# Patient Record
Sex: MALE | Employment: UNEMPLOYED | URBAN - METROPOLITAN AREA
[De-identification: names, ages, dates, MRNs, and addresses within clinical notes are randomized per-mention and may not be internally consistent; named-entity substitution may affect disease eponyms.]

---
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
## 2025-01-13 ENCOUNTER — HOSPITAL ENCOUNTER (OUTPATIENT)
Dept: NON INVASIVE DIAGNOSTICS | Facility: HOSPITAL | Age: 20
Discharge: HOME | End: 2025-01-13
Payer: OTHER GOVERNMENT

## 2025-01-13 VITALS
DIASTOLIC BLOOD PRESSURE: 82 MMHG | BODY MASS INDEX: 24 KG/M2 | OXYGEN SATURATION: 98 % | WEIGHT: 187 LBS | HEART RATE: 79 BPM | SYSTOLIC BLOOD PRESSURE: 122 MMHG | HEIGHT: 74 IN

## 2025-01-13 DIAGNOSIS — R93.89 ABNORMAL MRI: ICD-10-CM

## 2025-01-13 LAB
CHEST PAIN STATEMENT: NORMAL
MAX DIASTOLIC BP: 68 MMHG
MAX PREDICTED HEART RATE: 201 BPM
PROTOCOL NAME: NORMAL
STRESS POST EXERCISE DUR MIN: 12 MIN
STRESS POST EXERCISE DUR SEC: 35 SEC
STRESS POST PEAK HR: 171 BPM
STRESS POST PEAK SYSTOLIC BP: 160 MMHG
TARGET HR FORMULA: NORMAL
TEST INDICATION: NORMAL

## 2025-01-13 PROCEDURE — 93356 MYOCRD STRAIN IMG SPCKL TRCK: CPT

## 2025-01-13 PROCEDURE — 93356 MYOCRD STRAIN IMG SPCKL TRCK: CPT | Performed by: INTERNAL MEDICINE

## 2025-01-13 PROCEDURE — 93350 STRESS TTE ONLY: CPT | Performed by: INTERNAL MEDICINE

## 2025-01-13 PROCEDURE — 93350 STRESS TTE ONLY: CPT

## 2025-01-16 LAB
MAX HR PERCENT: 85 %
MAX HR: 171 BPM
RATE PRESSURE PRODUCT: NORMAL
SL CV LV EF: 55
SL CV STRESS RECOVERY BP: NORMAL MMHG
SL CV STRESS RECOVERY HR: 87 BPM
SL CV STRESS RECOVERY O2 SAT: 97 %
SL CV STRESS STAGE REACHED: 7
STRESS ANGINA INDEX: 0
STRESS BASELINE BP: NORMAL MMHG
STRESS BASELINE HR: 79 BPM
STRESS O2 SAT REST: 98 %
STRESS PEAK HR: 171 BPM
STRESS POST ESTIMATED WORKLOAD: 7.7 METS
STRESS POST EXERCISE DUR MIN: 12 MIN
STRESS POST EXERCISE DUR SEC: 35 SEC
STRESS POST O2 SAT PEAK: 97 %
STRESS POST PEAK BP: 160 MMHG

## 2025-03-19 ENCOUNTER — OFFICE VISIT (OUTPATIENT)
Dept: CARDIOLOGY CLINIC | Facility: CLINIC | Age: 20
End: 2025-03-19
Payer: OTHER GOVERNMENT

## 2025-03-19 VITALS
WEIGHT: 200 LBS | SYSTOLIC BLOOD PRESSURE: 132 MMHG | DIASTOLIC BLOOD PRESSURE: 78 MMHG | HEART RATE: 71 BPM | OXYGEN SATURATION: 99 % | BODY MASS INDEX: 25.68 KG/M2

## 2025-03-19 DIAGNOSIS — I49.3 PVC'S (PREMATURE VENTRICULAR CONTRACTIONS): ICD-10-CM

## 2025-03-19 DIAGNOSIS — I51.4 MYOCARDITIS, UNSPECIFIED CHRONICITY, UNSPECIFIED MYOCARDITIS TYPE (HCC): Primary | ICD-10-CM

## 2025-03-19 PROCEDURE — 99214 OFFICE O/P EST MOD 30 MIN: CPT | Performed by: INTERNAL MEDICINE

## 2025-03-19 NOTE — PROGRESS NOTES
Advanced Heart Failure Outpatient Progress Note - Hebert Lemus 20 y.o. male MRN: [id number]    Encounter: [id number]      Assessment/Plan:    Patient Active Problem List    Diagnosis Date Noted    Palpitations 03/15/2024    Abnormal EKG 03/15/2024    Hypokalemia 03/15/2024    Hypomagnesemia 03/15/2024       # Palpitations, resolved  - Unclear etiology, likely related to dehydration vs acute illness    # PVCs, very low burden (<0.1%) on 48 hour holter monitor  1 week Zio 7/8/24: Patient had a min HR of 44 bpm, max HR of 199 bpm, and avg HR of 81 bpm. Predominant underlying rhythm was Sinus Rhythm. Isolated SVEs were rare (<1.0%), and no SVE Couplets or SVE Triplets were present. Isolated VEs were rare (<1.0%), VE Couplets were rare (<1.0%), and no VE Triplets were present.     # Mildly increased wall thickness on echo  Normal LV mass index on cardiac MRI, possible athlete's heart. No evidence of infiltrative disease on cardiac MRI    # Myocarditis  - Unable to determine acuity based on cardiac MRI. No troponin elevation to suggest acute myocarditis during initial evaluation in March 2024 so less likely acute. Mother reports family being sick with URI and one sibling diagnosed with RSV a week or two prior.   - I personally reviewed the cardiac MRI with Dr. Willis in the office on 6/21/24 and findings most likely myocarditis. No other imaging findings concerning for sarcoidosis or infiltrative disease  - Normal exercise stress echo 1/13/25  - Obtain repeat cardiac MRI to reassess myocarditis    Studies- personally reviewed by me    Exercise stress echo 1/13/25:  A bicycle protocol stress test was performed. Overall, the patient's exercise capacity was normal for their age. The patient reached stage 7.0of the protocol after exercising for 12 min and 35 sec and had a maximal HR of 171 bpm (85% of MPHR) and 7.7 METS. The patient experienced no angina during the test. The patient achieved the target heart rate. The  patient reported no symptoms during the stress test. Blood pressure demonstrated a normal response and heart rate demonstrated a normal response to stress.   Left ventricle cavity has normal reduction in size at peak stress. The left ventricle systolic function is normal at peak stress. The peak stress echo showed normal wall motion.   Study impression: normal    Zio 7/8/24:  Patient had a min HR of 44 bpm, max HR of 199 bpm, and avg HR of 81 bpm. Predominant underlying rhythm was Sinus Rhythm. Isolated SVEs were rare (<1.0%), and no SVE Couplets or SVE Triplets were present. Isolated VEs were rare (<1.0%), VE Couplets were rare (<1.0%), and no VE Triplets were present.   TTE 6/29/24 [facility]: LVEF 61-65%. Normal LV size and wall thickness. Normal RV size.    Cardiac MRI 6/11/24:  Nonspecific late gadolinium enhancement in 3 focal areas (basal anteroseptal wall, mid inferoseptal wall, mid inferolateral wall). Differential includes myocarditis (acuity cannot be determined on the basis of this study) versus (less likely) inflammatory cardiomyopathy.  Mildly dilated left and right ventricles with normal LV and RV function. LVEF: 61%; RVEF: 56%. Moderately dilated left atrium. Normal LV mass index.   Small pericardial effusion with fluid present mainly inferiorly.   No evidence of infiltrative cardiomyopathy.   Consider repeat study in 3 months to evaluate progression/resolution of LV hyperenhancement if clinically indicated.     TTE 3/15/24  LVEF: 60%  LVIDd: 4.3cm  RV: normal size and systolic function  MR: trace  AV: normal   PASP: normal - 21 mmHg, mild TR  RVOT: no notching  Other: mildly increased wall thickness    EKG 3/14/24: sinus rhythm, nonspecific T wave abnormality, early repolarization      HPI:   6/21/24: 19 year old male with no known significant past medical history who presented with intermittent palpitations at the ED on 3/14/24 that has been occurring for the past couple of weeks. Reported dizziness and  blurry vision at that time. He was working on the car and it was warm and he was not hydrated. No syncope or near syncope. He does not have any history of heart disease or heart murmur in the past. He has no family history of sudden death, premature CAD or cardiomyopathy. He is very active and plays basketball. Mother reports that a week or two prior, the  whole family with viral syndrome and one sibling diagnosed with RSV. EKG at that time showed sinus rhythm with nonspecific T wave abnormality and early repolarization, QT normal.  He had no significant troponin elevation. He had an echocardiogram which showed normal biventricular size and systolic function, mild concentric hypertrophy and no significant valvular heart disease. He was discharged and had an outpatient holter monitor on 3/15/24 was placed which showed normal sinus rhythm with <0.1% burden of PVCs and PACs, no sustained or nonsustained episodes of PSVT or NSVT.   He was seen in the ED 3/21/24 for palpitations in [facility] that lasted for 20 minutes. EKG showed early repolarization and no arrhythmias noted during period of monitoring. He was seen in cardiology follow up 4/12/24 and was still reporting intermittent episodes of palpitations. Advised to obtain longer period of cardiac monitoring but opted to monitor symptoms at that time. He had a cardiac MRI 6/11/24 which showed nonspecific late gadolinium enhancement in 3 focal areas concerning for myocarditis. Patient is then referred for further evaluation and management  Patient is with his mother today. He is currently denying any more episodes of palpitations. No shortness of breath, chest pain, syncope or near syncope. He is anticipating going to college with scholarship to play division 1 basketball.     9/18/2024.:  Patient is here for follow-up.  Last seen as above.  He had a 1 week heart monitor which showed predominantly sinus rhythm with less than 1% PVC burden.  He was scheduled for an  "exercise stress echocardiogram for further evaluation but he was hospitalized at [facility] for behavioral issues.  He is now doing better.  He is in a community college and playing basketball but taking it easy.  Denies palpitations, dizziness or lightheadedness.  No chest pain or shortness of breath and current level of exertion.  No syncope or near syncope.    3/19/25: here for follow up. Last seen as above. He is overall doing well since last visit. He had the exercise stress echo completed 1/2025 which showed normal findings. He is playing basketball with no symptoms. No shortness of breath or chest pain on current level of exertion. No palpitations, dizziness or lightheadedness. EKG today with rhythm strip showed normal sinus rhythm, no PVCs.    History reviewed. No pertinent past medical history.    Review of Systems   Constitutional:  Negative for chills, fatigue and fever.   HENT:  Negative for ear pain and sore throat.    Eyes:  Negative for pain and visual disturbance.   Respiratory:  Negative for cough, chest tightness and shortness of breath.    Cardiovascular:  Negative for chest pain and palpitations.   Gastrointestinal:  Negative for abdominal pain and vomiting.   Genitourinary:  Negative for dysuria and hematuria.   Musculoskeletal:  Negative for arthralgias and back pain.   Skin:  Negative for color change and rash.   Neurological:  Negative for dizziness, seizures, syncope and light-headedness.   All other systems reviewed and are negative.       Allergies   Allergen Reactions    Geodon [Ziprasidone] Other (See Comments)     \"He gets stiff\"     .    Current Outpatient Medications:     divalproex sodium (DEPAKOTE) 250 mg DR tablet, Take 250 mg by mouth every 12 (twelve) hours, Disp: , Rfl:     hydrOXYzine pamoate (VISTARIL) 50 mg capsule, Take 1 capsule (50 mg total) by mouth 3 (three) times a day as needed for itching for up to 15 days (Patient not taking: Reported on 9/18/2024), Disp: 30 " capsule, Rfl: 0    LORazepam (ATIVAN) 0.5 mg tablet, Take 0.5 mg by mouth 3 (three) times a day, Disp: , Rfl:     OLANZapine (ZyPREXA) 5 mg tablet, Take 5 mg by mouth every evening, Disp: , Rfl:     ondansetron (ZOFRAN-ODT) 4 mg disintegrating tablet, Take 1 tablet (4 mg total) by mouth every 6 (six) hours as needed for nausea or vomiting (Patient not taking: Reported on 9/18/2024), Disp: 20 tablet, Rfl: 0    Social History     Socioeconomic History    Marital status: Single     Spouse name: Not on file    Number of children: Not on file    Years of education: Not on file    Highest education level: Not on file   Occupational History    Not on file   Tobacco Use    Smoking status: Never    Smokeless tobacco: Never   Vaping Use    Vaping status: Never Used   Substance and Sexual Activity    Alcohol use: Never    Drug use: Never    Sexual activity: Not on file   Other Topics Concern    Not on file   Social History Narrative    Not on file     Social Drivers of Health     Financial Resource Strain: Patient Declined (7/23/2024)    Received from [facility]    Overall Financial Resource Strain (CARDIA)     Difficulty of Paying Living Expenses: Patient declined   Recent Concern: Financial Resource Strain - Medium Risk (7/1/2024)    Received from [facility]    Overall Financial Resource Strain (CARDIA)     Difficulty of Paying Living Expenses: Somewhat hard   Food Insecurity: Patient Declined (7/23/2024)    Received from [facility]    Hunger Vital Sign     Worried About Running Out of Food in the Last Year: Patient declined     Ran Out of Food in the Last Year: Patient declined   Recent Concern: Food Insecurity - Food Insecurity Present (7/1/2024)    Received from [facility]    Hunger Vital Sign     Worried About Running Out of Food in the Last Year: Sometimes true     Ran Out of Food in the Last Year: Sometimes true   Transportation Needs: Patient  Declined (7/23/2024)    Received from [facility]    PRAPARE - Transportation     Lack of Transportation (Medical): Patient declined     Lack of Transportation (Non-Medical): Patient declined   Physical Activity: Not on file   Stress: Not on file   Social Connections: Not on file   Intimate Partner Violence: Patient Declined (7/23/2024)    [facility]    Humiliation, Afraid, Rape, and Kick questionnaire     Fear of Current or Ex-Partner: Patient declined     Emotionally Abused: Patient declined     Physically Abused: Patient declined     Sexually Abused: Patient declined   Housing Stability: High Risk (7/23/2024)    Received from [facility]    Housing Stability Vital Sign     Unable to Pay for Housing in the Last Year: No     Number of Times Moved in the Last Year: 2     Homeless in the Last Year: No       Family History   Problem Relation Age of Onset    No Known Problems Mother     No Known Problems Father        Physical Exam:    Vitals: Blood pressure 132/78, pulse 71, weight 90.7 kg (200 lb), SpO2 99%., Body mass index is 25.68 kg/m².,   Wt Readings from Last 3 Encounters:   03/19/25 90.7 kg (200 lb)   01/13/25 84.8 kg (187 lb) (85%, Z= 1.05)*   09/18/24 85.2 kg (187 lb 12.8 oz) (87%, Z= 1.10)*     * Growth percentiles are based on CDC (Boys, 2-20 Years) data.       Physical Exam  Constitutional:       General: He is not in acute distress.     Appearance: Normal appearance.   HENT:      Head: Normocephalic and atraumatic.      Mouth/Throat:      Mouth: Mucous membranes are moist.   Eyes:      General: No scleral icterus.     Extraocular Movements: Extraocular movements intact.   Neck:      Vascular: No JVD.   Cardiovascular:      Rate and Rhythm: Normal rate and regular rhythm.      Pulses: Normal pulses.      Heart sounds: S1 normal and S2 normal. No murmur heard.     No friction rub. No gallop.   Pulmonary:       "Effort: Pulmonary effort is normal.      Breath sounds: Normal breath sounds.   Abdominal:      General: There is no distension.      Palpations: Abdomen is soft.      Tenderness: There is no abdominal tenderness. There is no guarding or rebound.   Musculoskeletal:         General: Normal range of motion.      Cervical back: Neck supple.      Right lower leg: No edema.      Left lower leg: No edema.   Skin:     General: Skin is warm and dry.      Capillary Refill: Capillary refill takes less than 2 seconds.   Neurological:      General: No focal deficit present.      Mental Status: He is alert and oriented to person, place, and time.   Psychiatric:         Mood and Affect: Mood normal.         Labs & Results:    Lab Results   Component Value Date    WBC 7.82 07/21/2024    HGB 13.1 07/21/2024    HCT 38.3 07/21/2024    MCV 83 07/21/2024     07/21/2024     Lab Results   Component Value Date    SODIUM 137 07/23/2024    K 3.8 07/23/2024     07/23/2024    CO2 28 07/23/2024    BUN 12 07/23/2024    CREATININE 0.96 07/23/2024    GLUC 91 07/23/2024    CALCIUM 9.3 07/23/2024     No results found for: \"NTBNP\"   No results found for: \"CHOLESTEROL\"  No results found for: \"HDL\"  No results found for: \"TRIG\"  No results found for: \"NONHDLC\"    EKG personally reviewed by Jean Angeles MD.     Counseling / Coordination of Care  I have spent a total time of 30 minutes on 06/21/24 in caring for this patient including Diagnostic results, Impressions, Documenting in the medical record, Reviewing / ordering tests, medicine, procedures  , Obtaining or reviewing history  , and Communicating with other healthcare professionals .     Thank you for the opportunity to participate in the care of this patient.    JEAN ANGELES MD  ADVANCED HEART FAILURE AND MECHANICAL CIRCULATORY SUPPORT  [facility]     "

## 2025-04-09 ENCOUNTER — HOSPITAL ENCOUNTER (OUTPATIENT)
Dept: MRI IMAGING | Facility: HOSPITAL | Age: 20
Discharge: HOME | End: 2025-04-09
Attending: INTERNAL MEDICINE
Payer: OTHER GOVERNMENT

## 2025-04-09 DIAGNOSIS — I51.4 MYOCARDITIS, UNSPECIFIED CHRONICITY, UNSPECIFIED MYOCARDITIS TYPE (HCC): ICD-10-CM

## 2025-04-09 PROCEDURE — 75561 CARDIAC MRI FOR MORPH W/DYE: CPT

## 2025-04-09 PROCEDURE — A9585 GADOBUTROL INJECTION: HCPCS | Performed by: INTERNAL MEDICINE

## 2025-04-09 RX ORDER — GADOBUTROL 604.72 MG/ML
18 INJECTION INTRAVENOUS
Status: COMPLETED | OUTPATIENT
Start: 2025-04-09 | End: 2025-04-09

## 2025-04-09 RX ADMIN — GADOBUTROL 18 ML: 604.72 INJECTION INTRAVENOUS at 12:48

## 2025-04-16 ENCOUNTER — RESULTS FOLLOW-UP (OUTPATIENT)
Dept: CARDIOLOGY CLINIC | Facility: CLINIC | Age: 20
End: 2025-04-16

## 2025-05-20 ENCOUNTER — HOSPITAL ENCOUNTER (EMERGENCY)
Facility: HOSPITAL | Age: 20
Discharge: HOME | End: 2025-05-20
Attending: STUDENT IN AN ORGANIZED HEALTH CARE EDUCATION/TRAINING PROGRAM | Admitting: STUDENT IN AN ORGANIZED HEALTH CARE EDUCATION/TRAINING PROGRAM
Payer: OTHER GOVERNMENT

## 2025-05-20 VITALS
TEMPERATURE: 98.8 F | DIASTOLIC BLOOD PRESSURE: 78 MMHG | SYSTOLIC BLOOD PRESSURE: 146 MMHG | RESPIRATION RATE: 17 BRPM | OXYGEN SATURATION: 100 % | HEART RATE: 66 BPM

## 2025-05-20 DIAGNOSIS — Z13.9 ENCOUNTER FOR MEDICAL SCREENING EXAMINATION: Primary | ICD-10-CM

## 2025-05-20 PROCEDURE — 99283 EMERGENCY DEPT VISIT LOW MDM: CPT | Performed by: STUDENT IN AN ORGANIZED HEALTH CARE EDUCATION/TRAINING PROGRAM

## 2025-05-20 PROCEDURE — 99282 EMERGENCY DEPT VISIT SF MDM: CPT

## 2025-05-20 NOTE — ED PROVIDER NOTES
"  ED Disposition       None          Assessment & Plan       Medical Decision Making  Differential medical screening exam, behavioral health condition, dehydration.    Patient is a well-appearing 20-year-old male present emerged from no acute respiratory distress and vital signs unremarkable.  On initial physical examination patient was alert oriented answering questions according to person place and time.  He is aware of location.  He denies any SI, HI, visual or auditory hallucinations.  Patient does report to feeling safe at home.  Patient does not currently want any medical intervention.  He is currently awaiting a ride and  from his mother.  Discussed patient with mother over the phone and she does not want any interventions from a behavioral health standpoint.  Her plan is to  patient.  Patient was provided with a sandwich.  He is muttering in the room, at times the reciting scripture, he pulled the clock off the wall and politely gave it back.  Sister arrived to pick patient up.  Discussed return at any point in time if needed.        Amount and/or Complexity of Data Reviewed  Independent Historian: parent and EMS  External Data Reviewed: labs, radiology and notes.             Medications - No data to display    ED Risk Strat Scores                    No data recorded                            History of Present Illness       Chief Complaint   Patient presents with    Psychiatric Evaluation     Pt picked up at [facility] by EMS. Construction staff called after pt was sitting at site and reciting bible verses. Pt's dad says pt has \"been off for two days.\"        No past medical history on file.   Past Surgical History:   Procedure Laterality Date    FL LUMBAR PUNCTURE DIAGNOSTIC  6/28/2024      Family History   Problem Relation Age of Onset    No Known Problems Mother     No Known Problems Father       Social History[1]   E-Cigarette/Vaping    E-Cigarette Use Never User     "   E-Cigarette/Vaping Substances    Nicotine No     THC No     CBD No     Flavoring No     Other No     Unknown No       I have reviewed and agree with the history as documented.     HPI    Patient is a 20-year-old male presenting to the emergency department via EMS.  He arrives after being on scene at a construction site.  At the site he was sitting and reciting Bible verses.  Per EMS dad was on scene and reports to him being off for approximately 2 days.  Patient refuses to communicate to me but does however communicate through the nurse.  He does not want any medical intervention at this time.  Denies any SI, HI, visual or auditory hallucinations.  Denies any medications or medical history.    Review of Systems   Constitutional:  Negative for chills and fever.   HENT:  Negative for ear pain and sore throat.    Eyes:  Negative for pain and visual disturbance.   Respiratory:  Negative for cough and shortness of breath.    Cardiovascular:  Negative for chest pain and palpitations.   Gastrointestinal:  Negative for abdominal pain and vomiting.   Genitourinary:  Negative for dysuria and hematuria.   Musculoskeletal:  Negative for arthralgias and back pain.   Skin:  Negative for color change and rash.   Neurological:  Negative for seizures and syncope.   All other systems reviewed and are negative.          Objective       ED Triage Vitals [05/20/25 1345]   Temperature Pulse Blood Pressure Respirations SpO2 Patient Position - Orthostatic VS   98.8 °F (37.1 °C) 66 146/78 17 100 % --      Temp Source Heart Rate Source BP Location FiO2 (%) Pain Score    Oral -- -- -- --      Vitals      Date and Time Temp Pulse SpO2 Resp BP Pain Score FACES Pain Rating User   05/20/25 1345 98.8 °F (37.1 °C) -- -- -- -- -- -- [facility]   05/20/25 1345 -- 66 100 % 17 146/78 -- -- TW            Physical Exam  Vitals and nursing note reviewed.   Constitutional:       General: He is not in acute distress.     Appearance: He is well-developed.   HENT:       Head: Normocephalic and atraumatic.     Eyes:      Conjunctiva/sclera: Conjunctivae normal.       Cardiovascular:      Rate and Rhythm: Normal rate and regular rhythm.      Heart sounds: No murmur heard.  Pulmonary:      Effort: Pulmonary effort is normal. No respiratory distress.      Breath sounds: Normal breath sounds.   Abdominal:      Palpations: Abdomen is soft.      Tenderness: There is no abdominal tenderness.     Musculoskeletal:         General: No swelling.      Cervical back: Neck supple.     Skin:     General: Skin is warm and dry.      Capillary Refill: Capillary refill takes less than 2 seconds.     Neurological:      General: No focal deficit present.      Mental Status: He is alert and oriented to person, place, and time.     Psychiatric:         Mood and Affect: Mood normal.         Results Reviewed       None            No orders to display       Procedures    ED Medication and Procedure Management   Prior to Admission Medications   Prescriptions Last Dose Informant Patient Reported? Taking?   LORazepam (ATIVAN) 0.5 mg tablet  Self Yes No   Sig: Take 0.5 mg by mouth 3 (three) times a day   OLANZapine (ZyPREXA) 5 mg tablet  Self Yes No   Sig: Take 5 mg by mouth every evening   divalproex sodium (DEPAKOTE) 250 mg DR tablet  Self Yes No   Sig: Take 250 mg by mouth every 12 (twelve) hours   hydrOXYzine pamoate (VISTARIL) 50 mg capsule   No No   Sig: Take 1 capsule (50 mg total) by mouth 3 (three) times a day as needed for itching for up to 15 days   Patient not taking: Reported on 9/18/2024   ondansetron (ZOFRAN-ODT) 4 mg disintegrating tablet  Self No No   Sig: Take 1 tablet (4 mg total) by mouth every 6 (six) hours as needed for nausea or vomiting   Patient not taking: Reported on 9/18/2024      Facility-Administered Medications: None     Patient's Medications   Discharge Prescriptions    No medications on file     No discharge procedures on file.  ED SEPSIS DOCUMENTATION                [1]    Social History  Tobacco Use    Smoking status: Never    Smokeless tobacco: Never   Vaping Use    Vaping status: Never Used   Substance Use Topics    Alcohol use: Never    Drug use: Never        Sofia Jason DO  05/20/25 1504

## 2025-06-18 ENCOUNTER — HOSPITAL ENCOUNTER (EMERGENCY)
Facility: HOSPITAL | Age: 20
End: 2025-06-19
Attending: EMERGENCY MEDICINE
Payer: OTHER GOVERNMENT

## 2025-06-18 DIAGNOSIS — F29 PSYCHOSIS (HCC): Primary | ICD-10-CM

## 2025-06-18 LAB
AMPHETAMINES SERPL QL SCN: NEGATIVE
BARBITURATES UR QL: NEGATIVE
BENZODIAZ UR QL: NEGATIVE
COCAINE UR QL: NEGATIVE
ETHANOL EXG-MCNC: 0 MG/DL
FENTANYL UR QL SCN: NEGATIVE
HYDROCODONE UR QL SCN: NEGATIVE
METHADONE UR QL: NEGATIVE
OPIATES UR QL SCN: NEGATIVE
OXYCODONE+OXYMORPHONE UR QL SCN: NEGATIVE
PCP UR QL: NEGATIVE
THC UR QL: NEGATIVE

## 2025-06-18 PROCEDURE — 82075 ASSAY OF BREATH ETHANOL: CPT | Performed by: EMERGENCY MEDICINE

## 2025-06-18 PROCEDURE — 80307 DRUG TEST PRSMV CHEM ANLYZR: CPT | Performed by: EMERGENCY MEDICINE

## 2025-06-18 PROCEDURE — 99285 EMERGENCY DEPT VISIT HI MDM: CPT | Performed by: EMERGENCY MEDICINE

## 2025-06-18 PROCEDURE — 99283 EMERGENCY DEPT VISIT LOW MDM: CPT

## 2025-06-18 RX ORDER — LORAZEPAM 1 MG/1
1 TABLET ORAL 2 TIMES DAILY PRN
Status: DISCONTINUED | OUTPATIENT
Start: 2025-06-18 | End: 2025-06-19 | Stop reason: HOSPADM

## 2025-06-18 RX ORDER — OLANZAPINE 10 MG/1
20 TABLET, FILM COATED ORAL
Status: DISCONTINUED | OUTPATIENT
Start: 2025-06-18 | End: 2025-06-19 | Stop reason: HOSPADM

## 2025-06-18 RX ORDER — ZOLPIDEM TARTRATE 5 MG/1
10 TABLET ORAL
Status: DISCONTINUED | OUTPATIENT
Start: 2025-06-18 | End: 2025-06-19 | Stop reason: HOSPADM

## 2025-06-18 RX ORDER — DIVALPROEX SODIUM 125 MG/1
750 CAPSULE, COATED PELLETS ORAL 2 TIMES DAILY
Status: DISCONTINUED | OUTPATIENT
Start: 2025-06-18 | End: 2025-06-19 | Stop reason: HOSPADM

## 2025-06-18 RX ORDER — DIPHENHYDRAMINE HCL 50 MG
50 CAPSULE ORAL
COMMUNITY

## 2025-06-18 RX ORDER — ZOLPIDEM TARTRATE 10 MG/1
10 TABLET ORAL
COMMUNITY

## 2025-06-18 RX ORDER — DIPHENHYDRAMINE HCL 25 MG
50 TABLET ORAL
Status: DISCONTINUED | OUTPATIENT
Start: 2025-06-18 | End: 2025-06-19 | Stop reason: HOSPADM

## 2025-06-18 RX ADMIN — OLANZAPINE 20 MG: 10 TABLET, FILM COATED ORAL at 21:25

## 2025-06-18 RX ADMIN — DIVALPROEX SODIUM 750 MG: 125 CAPSULE, COATED PELLETS ORAL at 21:25

## 2025-06-18 RX ADMIN — ZOLPIDEM TARTRATE 10 MG: 5 TABLET, FILM COATED ORAL at 21:25

## 2025-06-18 NOTE — ED NOTES
Patient presented to ED with his parents (Adrian and James). Parents participated in the consult as per patients request. Patient has no SI/HI and states that he has no A/V hallucinations. Parents explained that patient can wonder sometimes and gave examples that Hebert was picked up at a construction site and he was picked up while doing push ups in the road on occasion. Hebert has no access to firearms and feels safe in the home. Hebert has had psychiatric hospitalizations in the past at [place] and would like to return. Hebert and his parents report that he is taking all of his medication appropriately and has no legal issues. Patient denies any drug or alcohol use and has no pending legal issues. Hebert stated that he lives with his mom and dad and he is eating well. Mom stated that his sleeping has been off and that when he wakes up in the  morning, sometimes at 5am, he will wonder outside. This is the time that he will be picked up by police or neighbors. Hebert stated that there has been no past abuse.   Patient wants to sign a 201 and go back to [facility] if possible. Patient and parents are ok with looking within [facility]. Patient would like Mom called when placement becomes available. Adrian Butt [phone]. Patient and Dr. Kelly signed the 201.

## 2025-06-18 NOTE — LETTER
Critical access hospital EMERGENCY DEPARTMENT  [address]  [place] [address]  Dept: [phone]      EMTALA TRANSFER CONSENT    NAME Hebert Lemus                           2005                              MRN [id number]    I have been informed of my rights regarding examination, treatment, and transfer   by Dr. Rosa Huerta DO    Benefits: Specialized equipment and/or services available at the receiving facility (Include comment)________________________    Risks: Potential for delay in receiving treatment      Consent for Transfer:  I acknowledge that my medical condition has been evaluated and explained to me by the emergency department physician or other qualified medical person and/or my attending physician, who has recommended that I be transferred to the service of  Accepting Physician: Dr. Dai at Accepting Facility Name, City & State : [facility], [address] [place] [address]. The above potential benefits of such transfer, the potential risks associated with such transfer, and the probable risks of not being transferred have been explained to me, and I fully understand them.  The doctor has explained that, in my case, the benefits of transfer outweigh the risks.  I agree to be transferred.    I authorize the performance of emergency medical procedures and treatments upon me in both transit and upon arrival at the receiving facility.  Additionally, I authorize the release of any and all medical records to the receiving facility and request they be transported with me, if possible.  I understand that the safest mode of transportation during a medical emergency is an ambulance and that the Hospital advocates the use of this mode of transport. Risks of traveling to the receiving facility by car, including absence of medical control, life sustaining equipment, such as oxygen, and medical personnel has been explained to me and I fully understand them.    (KEVYN CORRECT  BOX BELOW)  [X]  I consent to the stated transfer and to be transported by ambulance/helicopter.  [  ]  I consent to the stated transfer, but refuse transportation by ambulance and accept full responsibility for my transportation by car.  I understand the risks of non-ambulance transfers and I exonerate the Hospital and its staff from any deterioration in my condition that results from this refusal.    X___________________________________________    DATE  25  TIME________  Signature of patient or legally responsible individual signing on patient behalf           RELATIONSHIP TO PATIENT_________________________                  Provider Certification    NAME Hebert Lemus                                         2005                              MRN [id number]    A medical screening exam was performed on the above named patient.  Based on the examination:    Condition Necessitating Transfer There were no encounter diagnoses.    Patient Condition: The patient has been stabilized such that within reasonable medical probability, no material deterioration of the patient condition or the condition of the unborn child(richy) is likely to result from the transfer    Reason for Transfer: Level of Care needed not available at this facility    Transfer Requirements: Facility [facility], [address] [place] [address]   Space available and qualified personnel available for treatment as acknowledged by Elsy Martinez, , [phone]  Agreed to accept transfer and to provide appropriate medical treatment as acknowledged by       Dr. Dai  Appropriate medical records of the examination and treatment of the patient are provided at the time of transfer   STAFF INITIAL WHEN COMPLETED _______  Transfer will be performed by qualified personnel from             and appropriate transfer equipment as required, including the use of necessary and appropriate life support measures.    Provider Certification: I have examined  the patient and explained the following risks and benefits of being transferred/refusing transfer to the patient/family:         Based on these reasonable risks and benefits to the patient and/or the unborn child(richy), and based upon the information available at the time of the patient’s examination, I certify that the medical benefits reasonably to be expected from the provision of appropriate medical treatments at another medical facility outweigh the increasing risks, if any, to the individual’s medical condition, and in the case of labor to the unborn child, from effecting the transfer.    X____________________________________________ DATE 06/19/25        TIME_______      ORIGINAL - SEND TO MEDICAL RECORDS   COPY - SEND WITH PATIENT DURING TRANSFER

## 2025-06-18 NOTE — ED NOTES
In PT Back pack PT had a jacket, basketball, wrist brace, glasses case, lanyard, tennis ball, car key, sport warp   Locker #4     Hebert Hernández  06/18/25 1721       Hebert Hernández  06/18/25 1721

## 2025-06-19 VITALS
OXYGEN SATURATION: 98 % | HEART RATE: 68 BPM | TEMPERATURE: 98.2 F | SYSTOLIC BLOOD PRESSURE: 135 MMHG | RESPIRATION RATE: 16 BRPM | DIASTOLIC BLOOD PRESSURE: 75 MMHG

## 2025-06-19 RX ADMIN — DIVALPROEX SODIUM 750 MG: 125 CAPSULE, COATED PELLETS ORAL at 10:37

## 2025-06-19 RX ADMIN — LORAZEPAM 1 MG: 1 TABLET ORAL at 02:25

## 2025-06-19 NOTE — ED NOTES
Pt still pacing in secure holding.  Medicated as ordered for possible anxiety       Bridgette Flynn RN  06/19/25 5992

## 2025-06-19 NOTE — ED NOTES
Per intake there are no beds available [facility]. 201 sent to intake for review tomorrow.

## 2025-06-19 NOTE — ED NOTES
Patient is accepted at [facility].  Patient is accepted by Dr. Dai per Francie.     Transportation is arranged with TBD.    Transportation is scheduled for TBD.   Patient may go to the floor at anytime after 1200.      NIKOLES, RADHA

## 2025-06-19 NOTE — ED NOTES
RADHA notes, as per DARVIN Marmolejo will complete prior authorization    Insurance Authorization for admission:   Phone call placed to **.  Phone number: **.     Spoke to **.     ** days approved.  Level of care: **.  Review on **.   Authorization # **.        Eligibility Verification System checked - [phone]).  Online system / automated system indicates: **    Insurance Authorization for Transportation:    Phone call placed to **.   Phone number **.   Spoke to **.   Authorization #: **     RADHA ALBERT

## 2025-06-19 NOTE — ED NOTES
CW placed call to pt's mother, Adrian. CW provided Adrian w/updates on accepting facility. CW to call w/ETA once available.    CW placed call to pt's mother and provided ETA    TDS, CW

## 2025-06-19 NOTE — ED NOTES
CIS contacted mom, per patient's request and informed her that [facility] did not have any beds available. Patient stated that he is ok with looking at [facility] for placement.

## 2025-07-18 NOTE — ED PROVIDER NOTES
Time reflects when diagnosis was documented in both MDM as applicable and the Disposition within this note       Time User Action Codes Description Comment    6/19/2025 11:11 AM Rosa Huerta [F29] Psychosis (HCC)           ED Disposition       ED Disposition   Transfer to Behavioral Health Condition   --    Date/Time   Wed Jun 18, 2025  9:27 PM    Comment   Hebert Lemus should be transferred out to (TBD) and has been medically cleared.               Assessment & Plan       Medical Decision Making  20-year-old male with psychosis.  Will medically clear, have crisis speak with patient's family, suspect he will require inpatient medical care, due to his inability to participate in assumed care, 302 may need to be petitioned and upheld.    Amount and/or Complexity of Data Reviewed  Labs: ordered.    Risk  Decision regarding hospitalization.             Medications - No data to display    ED Risk Strat Scores                    No data recorded                            History of Present Illness       Chief Complaint   Patient presents with    Psychiatric Evaluation     Pt mother states pt acting differently and running off, police picked him up from doing push ups in the road. Had inpatient psych last year and was recently released for [place], mom states he has been unsafe, and going on other peoples properties. Psych recently changed meds per mom. Mom is looking for inpatient treatment       Past Medical History[1]   Past Surgical History[2]   Family History[3]   Social History[4]   E-Cigarette/Vaping    E-Cigarette Use Never User       E-Cigarette/Vaping Substances    Nicotine No     THC No     CBD No     Flavoring No     Other No     Unknown No       I have reviewed and agree with the history as documented.     20-year-old male presenting to the emergency department for psychiatric evaluation.  Patient presents in the care of his parents per mother he has been having episodes of excited delirium,  doing push-ups in the street, [place] police [place] the state.  Recently released from [place].  Recent adjustment in psychiatric medications.        Review of Systems   Unable to perform ROS: Psychiatric disorder           Objective       ED Triage Vitals [06/18/25 1612]   Temperature Pulse Blood Pressure Respirations SpO2 Patient Position - Orthostatic VS   98.4 °F (36.9 °C) 80 130/82 20 99 % Sitting      Temp Source Heart Rate Source BP Location FiO2 (%) Pain Score    Temporal Monitor Left arm -- --      Vitals      Date and Time Temp Pulse SpO2 Resp BP Pain Score FACES Pain Rating User   06/19/25 1018 -- 68 98 % 16 135/75 -- -- FB   06/18/25 2315 98.2 °F (36.8 °C) 85 100 % 18 123/67 -- -- ML   06/18/25 2148 -- 86 100 % 18 120/76 -- -- EJ   06/18/25 1612 98.4 °F (36.9 °C) 80 99 % 20 130/82 -- -- AG            Physical Exam  Constitutional:       General: He is not in acute distress.     Appearance: He is well-developed. He is not diaphoretic.   HENT:      Head: Normocephalic and atraumatic.     Eyes:      Pupils: Pupils are equal, round, and reactive to light.     Neck:      Vascular: No JVD.      Trachea: No tracheal deviation.     Cardiovascular:      Rate and Rhythm: Normal rate and regular rhythm.      Heart sounds: Normal heart sounds. No murmur heard.     No friction rub. No gallop.   Pulmonary:      Effort: Pulmonary effort is normal. No respiratory distress.      Breath sounds: Normal breath sounds. No wheezing or rales.   Abdominal:      General: Bowel sounds are normal. There is no distension.      Palpations: Abdomen is soft.      Tenderness: There is no abdominal tenderness. There is no guarding or rebound.     Skin:     General: Skin is warm and dry.      Coloration: Skin is not pale.     Neurological:      Mental Status: He is alert and oriented to person, place, and time.      Cranial Nerves: No cranial nerve deficit.      Motor: No abnormal muscle tone.     Psychiatric:         Mood and Affect:  Affect is labile and inappropriate.         Speech: Speech is rapid and pressured.         Behavior: Behavior is agitated.         Cognition and Memory: Cognition is impaired.         Judgment: Judgment is impulsive.         Results Reviewed       Procedure Component Value Units Date/Time    Rapid drug screen, urine [[id number]]  (Normal) Collected: 06/18/25 1737    Lab Status: Final result Specimen: Urine, Other Updated: 06/18/25 1758     Amph/Meth UR Negative     Barbiturate Ur Negative     Benzodiazepine Urine Negative     Cocaine Urine Negative     Methadone Urine Negative     Opiate Urine Negative     PCP Ur Negative     THC Urine Negative     Oxycodone Urine Negative     Fentanyl Urine Negative     HYDROCODONE URINE Negative    Narrative:      FOR MEDICAL PURPOSES ONLY.   IF CONFIRMATION NEEDED PLEASE CONTACT THE LAB WITHIN 5 DAYS.    Drug Screen Cutoff Levels:  AMPHETAMINE/METHAMPHETAMINES  1000 ng/mL  BARBITURATES     200 ng/mL  BENZODIAZEPINES     200 ng/mL  COCAINE      300 ng/mL  METHADONE      300 ng/mL  OPIATES      300 ng/mL  PHENCYCLIDINE     25 ng/mL  THC       50 ng/mL  OXYCODONE      100 ng/mL  FENTANYL      5 ng/mL  HYDROCODONE     300 ng/mL    POCT alcohol breath test [[id number]]  (Normal) Collected: 06/18/25 1724    Lab Status: Final result Updated: 06/18/25 1725     EXTBreath Alcohol 0.000            No orders to display       Procedures    ED Medication and Procedure Management   Prior to Admission Medications   Prescriptions Last Dose Informant Patient Reported? Taking?   DIVALPROEX SODIUM PO  Mother, Father Yes Yes   Sig: Take 750 mg by mouth in the morning and 750 mg before bedtime.   LORazepam (ATIVAN) 0.5 mg tablet  Mother, Father Yes Yes   Sig: Take 1 mg by mouth as needed in the morning and 1 mg as needed in the evening for anxiety.   OLANZapine (ZyPREXA) 20 MG tablet  Mother, Father Yes Yes   Sig: Take 20 mg by mouth daily at bedtime   diphenhydrAMINE (Banophen) 50 mg capsule  Mother,  Father Yes Yes   Sig: Take 50 mg by mouth daily at bedtime as needed for itching   hydrOXYzine pamoate (VISTARIL) 50 mg capsule   No No   Sig: Take 1 capsule (50 mg total) by mouth 3 (three) times a day as needed for itching for up to 15 days   Patient not taking: Reported on 9/18/2024   ondansetron (ZOFRAN-ODT) 4 mg disintegrating tablet  Self No No   Sig: Take 1 tablet (4 mg total) by mouth every 6 (six) hours as needed for nausea or vomiting   Patient not taking: Reported on 9/18/2024   zolpidem (AMBIEN) 10 mg tablet  Mother, Father Yes Yes   Sig: Take 10 mg by mouth daily at bedtime      Facility-Administered Medications: None     Discharge Medication List as of 6/19/2025 11:21 AM        CONTINUE these medications which have NOT CHANGED    Details   diphenhydrAMINE (Banophen) 50 mg capsule Take 50 mg by mouth daily at bedtime as needed for itching, Historical Med      DIVALPROEX SODIUM PO Take 750 mg by mouth in the morning and 750 mg before bedtime., Starting Thu 8/15/2024, Historical Med      LORazepam (ATIVAN) 0.5 mg tablet Take 1 mg by mouth as needed in the morning and 1 mg as needed in the evening for anxiety., Historical Med      OLANZapine (ZyPREXA) 20 MG tablet Take 20 mg by mouth daily at bedtime, Starting Wed 7/10/2024, Historical Med      zolpidem (AMBIEN) 10 mg tablet Take 10 mg by mouth daily at bedtime, Historical Med      hydrOXYzine pamoate (VISTARIL) 50 mg capsule Take 1 capsule (50 mg total) by mouth 3 (three) times a day as needed for itching for up to 15 days, Starting Wed 6/26/2024, Until Thu 7/11/2024 at 2359, Normal      ondansetron (ZOFRAN-ODT) 4 mg disintegrating tablet Take 1 tablet (4 mg total) by mouth every 6 (six) hours as needed for nausea or vomiting, Starting Thu 6/27/2024, Normal           No discharge procedures on file.  ED SEPSIS DOCUMENTATION   Time reflects when diagnosis was documented in both MDM as applicable and the Disposition within this note       Time User Action  Codes Description Comment    6/19/2025 11:11 AM Rosa Huerta Add [F29] Psychosis (HCC)                    [1] No past medical history on file.  [2]   Past Surgical History:  Procedure Laterality Date    FL LUMBAR PUNCTURE DIAGNOSTIC  6/28/2024   [3]   Family History  Problem Relation Name Age of Onset    No Known Problems Mother      No Known Problems Father     [4]   Social History  Tobacco Use    Smoking status: Never    Smokeless tobacco: Never   Vaping Use    Vaping status: Never Used   Substance Use Topics    Alcohol use: Never    Drug use: Never        Danish Kelly MD  07/18/25 0113